# Patient Record
Sex: FEMALE | Race: OTHER | HISPANIC OR LATINO | URBAN - METROPOLITAN AREA
[De-identification: names, ages, dates, MRNs, and addresses within clinical notes are randomized per-mention and may not be internally consistent; named-entity substitution may affect disease eponyms.]

---

## 2020-10-01 ENCOUNTER — EMERGENCY (EMERGENCY)
Facility: HOSPITAL | Age: 30
LOS: 0 days | Discharge: HOME | End: 2020-10-01
Attending: EMERGENCY MEDICINE | Admitting: EMERGENCY MEDICINE
Payer: MEDICAID

## 2020-10-01 VITALS
RESPIRATION RATE: 100 BRPM | SYSTOLIC BLOOD PRESSURE: 110 MMHG | TEMPERATURE: 97 F | DIASTOLIC BLOOD PRESSURE: 71 MMHG | HEART RATE: 88 BPM | OXYGEN SATURATION: 100 %

## 2020-10-01 VITALS — HEIGHT: 62 IN | WEIGHT: 100.09 LBS

## 2020-10-01 DIAGNOSIS — K08.89 OTHER SPECIFIED DISORDERS OF TEETH AND SUPPORTING STRUCTURES: ICD-10-CM

## 2020-10-01 DIAGNOSIS — Z87.891 PERSONAL HISTORY OF NICOTINE DEPENDENCE: ICD-10-CM

## 2020-10-01 DIAGNOSIS — K04.7 PERIAPICAL ABSCESS WITHOUT SINUS: ICD-10-CM

## 2020-10-01 PROCEDURE — 99282 EMERGENCY DEPT VISIT SF MDM: CPT

## 2020-10-01 NOTE — CONSULT NOTE ADULT - SUBJECTIVE AND OBJECTIVE BOX
Emergency #17 k01.1 Blood Pressure: 100/60 Pulse: 70 Temperature: 98.2  Patient is a 30y old  Female who presents with a chief complaint of pain in lower left area.    HPI: Patient reports she has pain in lower left area. Pain is throbbing and increased over the past few days waking her up from her sleep. Patient does not have difficulty swallowing or breathing.      PAST MEDICAL & SURGICAL HISTORY:  (  + ) pregnancy, 12 weeks    MEDICATIONS  (STANDING): tylenol and orajel    Allergies  No Known Allergies    *SOCIAL HISTORY: ( -  ) Tobacco; (-   ) ETOH    Vital Signs Last 24 Hrs  T(C): 36.1 (01 Oct 2020 13:28), Max: 36.1 (01 Oct 2020 13:28)  T(F): 97 (01 Oct 2020 13:28), Max: 97 (01 Oct 2020 13:28)  HR: 88 (01 Oct 2020 13:28) (88 - 88)  BP: 110/71 (01 Oct 2020 13:28) (110/71 - 110/71)  BP(mean): --  RR: 100 (01 Oct 2020 13:28) (18 - 100)  SpO2: 100% (01 Oct 2020 13:28) (100% - 100%)    EOE:  TMJ ( -  ) clicks                     ( -  ) pops                     ( -  ) crepitus             Mandible <<FROM>>             Facial bones and MOM <<grossly intact>>             ( -  ) trismus             ( -  ) lymphadenopathy             ( -  ) swelling             ( -  ) asymmetry             ( -  ) palpation             (  - ) dyspnea             ( -  ) dysphagia             ( -  ) loss of consciousness    IOE:  permanent dentition  Findings on #17:           ( +  ) percussion           ( +  ) palpation           ( +  ) swelling            (-   ) abscess           ( -  ) sinus tract  Pericoronitis #17    *DENTAL RADIOGRAPHS: one panoramic radiograph taken indicating partial bony impaction of #17 requiring extraction with Oral Surgery.    *ASSESSMENT: partial bony impaction of #17 requiring extraction with Oral Surgery    *PLAN: Antibiotic therapy, over the counter pain medication and Refer to outpatient dentist for extraction    PROCEDURE:   Verbal and written consent given.  Intraoral and extraoral exam completed.   Patient informed #17 is partially impacted and needs extraction with oral surgery. Patient advised to call insurance and seek care for #17 with oral surgeon as soon as possible.  Prescription written for Amoxicillin 500mg tid for 7 days and advised to take over the counter tylenol for pain.     RECOMMENDATIONS:  1) Amoxicillin 500mg tid for 7 days  2)Over the counter tylenol  2) Dental F/U with outpatient dentist for comprehensive dental care.   3) If any difficulty swallowing/breathing, fever occur, return to ER.     Amanda Garcia,[id number]

## 2020-10-01 NOTE — ED PROVIDER NOTE - OBJECTIVE STATEMENT
31 yo female 3mos pregnant presents c/o L lower dental pain that has been gradually worsening over the past 4 days. denies any other symptoms including fevers, chill, headache, recent illness/travel, cough, abdominal pain, chest pain, or SOB.

## 2020-10-01 NOTE — ED PROVIDER NOTE - PHYSICAL EXAMINATION
Gen: NAD, AOx3  Head: NCAT  HEENT: PERRL, oral mucosa moist, normal conjunctiva, oropharynx clear without exudate or erythema, Dental: multiple caries, no edema/erythema/lesions/discharge, TTP tooth #17  Lung: CTAB, no respiratory distress, no wheezing, rales, rhonchi  CV: normal s1/s2, rrr, Normal perfusion, pulses 2+ throughout  Abd: soft, NTND, no CVA tenderness  MSK: No edema, no visible deformities, full range of motion in all 4 extremities  Neuro: No focal neurologic deficits  Skin: No rash   Psych: normal affect